# Patient Record
Sex: MALE | Race: WHITE | Employment: OTHER | URBAN - METROPOLITAN AREA
[De-identification: names, ages, dates, MRNs, and addresses within clinical notes are randomized per-mention and may not be internally consistent; named-entity substitution may affect disease eponyms.]

---

## 2020-11-09 ENCOUNTER — OFFICE VISIT (OUTPATIENT)
Dept: INTERNAL MEDICINE CLINIC | Facility: CLINIC | Age: 55
End: 2020-11-09
Payer: COMMERCIAL

## 2020-11-09 VITALS
WEIGHT: 304.13 LBS | SYSTOLIC BLOOD PRESSURE: 126 MMHG | BODY MASS INDEX: 35.91 KG/M2 | HEIGHT: 77 IN | DIASTOLIC BLOOD PRESSURE: 88 MMHG | TEMPERATURE: 98 F | HEART RATE: 86 BPM

## 2020-11-09 DIAGNOSIS — B02.9 HERPES ZOSTER WITHOUT COMPLICATION: Primary | ICD-10-CM

## 2020-11-09 PROCEDURE — 99072 ADDL SUPL MATRL&STAF TM PHE: CPT | Performed by: FAMILY MEDICINE

## 2020-11-09 PROCEDURE — 90686 IIV4 VACC NO PRSV 0.5 ML IM: CPT | Performed by: FAMILY MEDICINE

## 2020-11-09 PROCEDURE — 99203 OFFICE O/P NEW LOW 30 MIN: CPT | Performed by: FAMILY MEDICINE

## 2020-11-09 PROCEDURE — 90471 IMMUNIZATION ADMIN: CPT | Performed by: FAMILY MEDICINE

## 2020-11-09 PROCEDURE — 3079F DIAST BP 80-89 MM HG: CPT | Performed by: FAMILY MEDICINE

## 2020-11-09 PROCEDURE — 3074F SYST BP LT 130 MM HG: CPT | Performed by: FAMILY MEDICINE

## 2020-11-09 PROCEDURE — 3008F BODY MASS INDEX DOCD: CPT | Performed by: FAMILY MEDICINE

## 2020-11-09 RX ORDER — VALACYCLOVIR HYDROCHLORIDE 1 G/1
1 TABLET, FILM COATED ORAL 3 TIMES DAILY
Qty: 21 TABLET | Refills: 0 | Status: SHIPPED | OUTPATIENT
Start: 2020-11-09 | End: 2020-11-16

## 2020-11-09 NOTE — PROGRESS NOTES
HPI:    Patient ID: Trung Whitney is a 48year old male. HPI Started with what he thought was a spider bite on his right cheek near his ear about 5 days ago. Painful, more spots started to develop over the next few days.  Lymph nodes near the area we Schedule physical.     Orders Placed This Encounter      Flulaval 6 months and older 0.5 ml PFS [[id number]]      Meds This Visit:  Requested Prescriptions     Signed Prescriptions Disp Refills   • valACYclovir HCl 1 G Oral Tab 21 tablet 0     Sig: Take 1 table

## 2021-04-09 DIAGNOSIS — Z23 NEED FOR VACCINATION: ICD-10-CM

## 2021-08-06 ENCOUNTER — APPOINTMENT (OUTPATIENT)
Dept: GENERAL RADIOLOGY | Age: 56
End: 2021-08-06
Attending: PHYSICIAN ASSISTANT
Payer: COMMERCIAL

## 2021-08-06 ENCOUNTER — HOSPITAL ENCOUNTER (OUTPATIENT)
Age: 56
Discharge: HOME | End: 2021-08-06
Payer: COMMERCIAL

## 2021-08-06 VITALS
SYSTOLIC BLOOD PRESSURE: 146 MMHG | OXYGEN SATURATION: 98 % | HEART RATE: 65 BPM | RESPIRATION RATE: 16 BRPM | TEMPERATURE: 98 F | DIASTOLIC BLOOD PRESSURE: 90 MMHG

## 2021-08-06 DIAGNOSIS — S90.02XA CONTUSION OF LEFT ANKLE, INITIAL ENCOUNTER: ICD-10-CM

## 2021-08-06 DIAGNOSIS — S93.402A SPRAIN OF LEFT ANKLE, UNSPECIFIED LIGAMENT, INITIAL ENCOUNTER: Primary | ICD-10-CM

## 2021-08-06 PROCEDURE — 73630 X-RAY EXAM OF FOOT: CPT | Performed by: PHYSICIAN ASSISTANT

## 2021-08-06 PROCEDURE — 99203 OFFICE O/P NEW LOW 30 MIN: CPT | Performed by: PHYSICIAN ASSISTANT

## 2021-08-06 PROCEDURE — 73610 X-RAY EXAM OF ANKLE: CPT | Performed by: PHYSICIAN ASSISTANT

## 2021-08-06 NOTE — ED INITIAL ASSESSMENT (HPI)
Pt with c/o left foot pain and swelling. Pt stepped wrong on Wednesday injuring foot.   Pt icing at home

## 2021-08-06 NOTE — ED PROVIDER NOTES
Patient Seen in: [facility] [address] [place] [address]      History   Patient presents with:  Leg or Foot Injury    Stated Complaint: foot swollen/painful    HPI/Subjective:   HPI    52-year-old male presents to the IC for evaluation of left ankle and foot pain since 2 ligament tenderness. Decreased range of motion. Normal pulse. Left foot: Normal range of motion. Bony tenderness (along 5th metatarsal) present. No swelling or deformity. Normal pulse. Skin:     General: Skin is warm and dry.       Capillary Refill: pain and swelling all around left ankle and down left lateral foot. FINDINGS:  BONES:  There are enthesophytes on dorsal and plantar aspect of the calcaneus.   There is mild osteophyte formation dorsal aspect of the talonavicular joint and articulation o

## 2022-04-18 ENCOUNTER — TELEPHONE (OUTPATIENT)
Dept: INTERNAL MEDICINE CLINIC | Facility: CLINIC | Age: 57
End: 2022-04-18

## 2022-04-18 NOTE — TELEPHONE ENCOUNTER
Please call patient to see if he has new PCP. If he doesn't make sure he schedules an annual check-up.  If he no longer sees me as his PCP, send for removal.

## 2023-09-11 ENCOUNTER — LAB ENCOUNTER (OUTPATIENT)
Dept: LAB | Age: 58
End: 2023-09-11
Attending: INTERNAL MEDICINE
Payer: COMMERCIAL

## 2023-09-11 ENCOUNTER — OFFICE VISIT (OUTPATIENT)
Dept: INTERNAL MEDICINE CLINIC | Facility: CLINIC | Age: 58
End: 2023-09-11
Payer: COMMERCIAL

## 2023-09-11 VITALS
HEIGHT: 77 IN | BODY MASS INDEX: 32.82 KG/M2 | SYSTOLIC BLOOD PRESSURE: 128 MMHG | DIASTOLIC BLOOD PRESSURE: 84 MMHG | WEIGHT: 278 LBS | RESPIRATION RATE: 16 BRPM | OXYGEN SATURATION: 98 % | TEMPERATURE: 98 F | HEART RATE: 73 BPM

## 2023-09-11 DIAGNOSIS — Z12.5 SCREENING FOR PROSTATE CANCER: ICD-10-CM

## 2023-09-11 DIAGNOSIS — Z13.29 SCREENING FOR THYROID DISORDER: ICD-10-CM

## 2023-09-11 DIAGNOSIS — Z13.0 SCREENING FOR BLOOD DISEASE: ICD-10-CM

## 2023-09-11 DIAGNOSIS — Z12.11 SCREEN FOR COLON CANCER: ICD-10-CM

## 2023-09-11 DIAGNOSIS — Z83.3 FAMILY HISTORY OF DIABETES MELLITUS: ICD-10-CM

## 2023-09-11 DIAGNOSIS — Z00.00 WELLNESS EXAMINATION: Primary | ICD-10-CM

## 2023-09-11 DIAGNOSIS — Z13.228 SCREENING FOR METABOLIC DISORDER: ICD-10-CM

## 2023-09-11 DIAGNOSIS — Z98.890 HISTORY OF CRANIOTOMY: ICD-10-CM

## 2023-09-11 DIAGNOSIS — Z13.220 SCREENING, LIPID: ICD-10-CM

## 2023-09-11 DIAGNOSIS — E66.09 CLASS 1 OBESITY DUE TO EXCESS CALORIES WITHOUT SERIOUS COMORBIDITY WITH BODY MASS INDEX (BMI) OF 32.0 TO 32.9 IN ADULT: ICD-10-CM

## 2023-09-11 DIAGNOSIS — Z23 NEED FOR VACCINATION FOR ZOSTER: ICD-10-CM

## 2023-09-11 PROBLEM — E66.811 CLASS 1 OBESITY DUE TO EXCESS CALORIES WITHOUT SERIOUS COMORBIDITY WITH BODY MASS INDEX (BMI) OF 32.0 TO 32.9 IN ADULT: Status: ACTIVE | Noted: 2023-09-11

## 2023-09-11 PROBLEM — E78.00 HYPERCHOLESTEROLEMIA: Status: ACTIVE | Noted: 2023-09-11

## 2023-09-11 LAB
ALBUMIN SERPL-MCNC: 3.3 G/DL (ref 3.4–5)
ALBUMIN/GLOB SERPL: 0.8 {RATIO} (ref 1–2)
ALP LIVER SERPL-CCNC: 64 U/L
ALT SERPL-CCNC: 21 U/L
ANION GAP SERPL CALC-SCNC: 7 MMOL/L (ref 0–18)
AST SERPL-CCNC: 19 U/L (ref 15–37)
BASOPHILS # BLD AUTO: 0.12 X10(3) UL (ref 0–0.2)
BASOPHILS NFR BLD AUTO: 1.3 %
BILIRUB SERPL-MCNC: 0.4 MG/DL (ref 0.1–2)
BUN BLD-MCNC: 14 MG/DL (ref 7–18)
CALCIUM BLD-MCNC: 8.5 MG/DL (ref 8.5–10.1)
CHLORIDE SERPL-SCNC: 107 MMOL/L (ref 98–112)
CHOLEST SERPL-MCNC: 175 MG/DL (ref ?–200)
CO2 SERPL-SCNC: 25 MMOL/L (ref 21–32)
COMPLEXED PSA SERPL-MCNC: 1.08 NG/ML (ref ?–4)
CREAT BLD-MCNC: 0.97 MG/DL
EGFRCR SERPLBLD CKD-EPI 2021: 92 ML/MIN/1.73M2 (ref 60–?)
EOSINOPHIL # BLD AUTO: 0.16 X10(3) UL (ref 0–0.7)
EOSINOPHIL NFR BLD AUTO: 1.8 %
ERYTHROCYTE [DISTWIDTH] IN BLOOD BY AUTOMATED COUNT: 13.2 %
EST. AVERAGE GLUCOSE BLD GHB EST-MCNC: 111 MG/DL (ref 68–126)
FASTING PATIENT LIPID ANSWER: YES
FASTING STATUS PATIENT QL REPORTED: YES
GLOBULIN PLAS-MCNC: 4 G/DL (ref 2.8–4.4)
GLUCOSE BLD-MCNC: 82 MG/DL (ref 70–99)
HBA1C MFR BLD: 5.5 % (ref ?–5.7)
HCT VFR BLD AUTO: 45.5 %
HDLC SERPL-MCNC: 53 MG/DL (ref 40–59)
HGB BLD-MCNC: 15.1 G/DL
IMM GRANULOCYTES # BLD AUTO: 0.03 X10(3) UL (ref 0–1)
IMM GRANULOCYTES NFR BLD: 0.3 %
LDLC SERPL CALC-MCNC: 105 MG/DL (ref ?–100)
LYMPHOCYTES # BLD AUTO: 2.96 X10(3) UL (ref 1–4)
LYMPHOCYTES NFR BLD AUTO: 32.6 %
MCH RBC QN AUTO: 29.6 PG (ref 26–34)
MCHC RBC AUTO-ENTMCNC: 33.2 G/DL (ref 31–37)
MCV RBC AUTO: 89.2 FL
MONOCYTES # BLD AUTO: 0.85 X10(3) UL (ref 0.1–1)
MONOCYTES NFR BLD AUTO: 9.4 %
NEUTROPHILS # BLD AUTO: 4.96 X10 (3) UL (ref 1.5–7.7)
NEUTROPHILS # BLD AUTO: 4.96 X10(3) UL (ref 1.5–7.7)
NEUTROPHILS NFR BLD AUTO: 54.6 %
NONHDLC SERPL-MCNC: 122 MG/DL (ref ?–130)
OSMOLALITY SERPL CALC.SUM OF ELEC: 288 MOSM/KG (ref 275–295)
PLATELET # BLD AUTO: 343 10(3)UL (ref 150–450)
POTASSIUM SERPL-SCNC: 4.2 MMOL/L (ref 3.5–5.1)
PROT SERPL-MCNC: 7.3 G/DL (ref 6.4–8.2)
RBC # BLD AUTO: 5.1 X10(6)UL
SODIUM SERPL-SCNC: 139 MMOL/L (ref 136–145)
TRIGL SERPL-MCNC: 95 MG/DL (ref 30–149)
TSI SER-ACNC: 0.87 MIU/ML (ref 0.36–3.74)
VLDLC SERPL CALC-MCNC: 16 MG/DL (ref 0–30)
WBC # BLD AUTO: 9.1 X10(3) UL (ref 4–11)

## 2023-09-11 PROCEDURE — 80061 LIPID PANEL: CPT

## 2023-09-11 PROCEDURE — 90750 HZV VACC RECOMBINANT IM: CPT | Performed by: INTERNAL MEDICINE

## 2023-09-11 PROCEDURE — 90471 IMMUNIZATION ADMIN: CPT | Performed by: INTERNAL MEDICINE

## 2023-09-11 PROCEDURE — 3008F BODY MASS INDEX DOCD: CPT | Performed by: INTERNAL MEDICINE

## 2023-09-11 PROCEDURE — 99386 PREV VISIT NEW AGE 40-64: CPT | Performed by: INTERNAL MEDICINE

## 2023-09-11 PROCEDURE — 84443 ASSAY THYROID STIM HORMONE: CPT

## 2023-09-11 PROCEDURE — 85025 COMPLETE CBC W/AUTO DIFF WBC: CPT

## 2023-09-11 PROCEDURE — 36415 COLL VENOUS BLD VENIPUNCTURE: CPT

## 2023-09-11 PROCEDURE — 80053 COMPREHEN METABOLIC PANEL: CPT

## 2023-09-11 PROCEDURE — 3074F SYST BP LT 130 MM HG: CPT | Performed by: INTERNAL MEDICINE

## 2023-09-11 PROCEDURE — 83036 HEMOGLOBIN GLYCOSYLATED A1C: CPT

## 2023-09-11 PROCEDURE — 3079F DIAST BP 80-89 MM HG: CPT | Performed by: INTERNAL MEDICINE

## 2023-12-08 PROBLEM — D12.0 BENIGN NEOPLASM OF CECUM: Status: ACTIVE | Noted: 2023-12-08

## 2023-12-08 PROBLEM — Z12.11 SPECIAL SCREENING FOR MALIGNANT NEOPLASM OF COLON: Status: ACTIVE | Noted: 2023-12-08

## 2023-12-08 PROBLEM — D12.5 BENIGN NEOPLASM OF SIGMOID COLON: Status: ACTIVE | Noted: 2023-12-08

## 2024-01-17 NOTE — PROGRESS NOTES
Hematology/Oncology Initial Consultation Note    Patient Name: Good Gaytan  Medical Record Number: [id number]    YOB: 1965   Date of Consultation: 1/22/2024   PCP: Pedro Chen DO    Reason for Consultation:  Good Gaytan was seen today for the diagnosis of colon cancer    Oncologic History:  12/8/23: s/p colonoscopy with polypectomy in sigmoid colon; path positive for moderately differentiated adenocarcinoma, invasive into stalk of background pedunculated tubulovillous adenoma with high grade dysplasia. Margins negative    History of Present Illness:      59 y/o M PMH cavernous angioma resected 2007, colon ca s/p polypectomy 12/2023 who presents to [facility] care.    - here with wife  - this was his first screening colonoscopy  - he was not having any rectal bleeding, abdominal pain, weight loss  - he also has a hx of cavernous angioma s/p resection in 2007; did not follow up after but denies any recurrence  - never smoker. Occasional alcohol. Obese.  Has 2 kids. Sibling has gotten a colonoscopy  - no known FH of cancer    Past Medical History:  Past Medical History:   Diagnosis Date    Calculus of kidney     Cavernous angioma     Pain in joints        No past surgical history on file.    Home Medications:  No outpatient medications have been marked as taking for the 1/22/24 encounter (Appointment) with Michael Odom MD.     -------  Current Outpatient Medications on File Prior to Visit   Medication Sig Dispense Refill    PEG 3350-KCl-Na Bicarb-NaCl 420 g Oral Recon Soln Take as directed by physician 4000 mL 0     No current facility-administered medications on file prior to visit.       Allergies:   No Known Allergies    Psychosocial History:  Social History     Social History Narrative    Not on file     Social History     Socioeconomic History    Marital status:    Tobacco Use    Smoking status: Never    Smokeless tobacco: Never   Substance and Sexual Activity    Alcohol  use: Yes    Drug use: Never       Family Medical History:  Family History   Problem Relation Age of Onset    Diabetes Mother        Review of Systems:  A 10-point ROS was done with pertinent positives and negative per the HPI    Vital Signs:  Height: --  Weight: --  BSA (Calculated - sq m): --  Pulse: --  BP: --  Temp: --  Do Not Use - Resp Rate: --  SpO2: --    Wt Readings from Last 6 Encounters:   12/07/23 126.1 kg (278 lb)   09/11/23 126.1 kg (278 lb)   11/09/20 (!) 138 kg (304 lb 2 oz)         Physical Examination:  General: Patient is alert and oriented, not in acute distress  Psych: Mood and affect are appropriate  Eyes: EOMI, PERRL  ENT: Oropharynx is clear, no adenopathy  CV: no LE edema  Respiratory: Non-labored respirations  GI/Abd: Soft, non-tender, no palpable masses  Neurological: Grossly intact   Lymphatics: No palpable cervical, supraclavicular, axillary, or inguinal lymphadenopathy  Skin: no rashes or petechiae    Laboratory:  Lab Results   Component Value Date    WBC 9.1 09/11/2023    HGB 15.1 09/11/2023    HCT 45.5 09/11/2023    MCV 89.2 09/11/2023    MCH 29.6 09/11/2023    MCHC 33.2 09/11/2023    RDW 13.2 09/11/2023    .0 09/11/2023     Lab Results   Component Value Date    GLU 82 09/11/2023    BUN 14 09/11/2023    CREATSERUM 0.97 09/11/2023    ANIONGAP 7 09/11/2023    CA 8.5 09/11/2023    OSMOCALC 288 09/11/2023    ALKPHO 64 09/11/2023    AST 19 09/11/2023    ALT 21 09/11/2023    BILT 0.4 09/11/2023    TP 7.3 09/11/2023    ALB 3.3 (L) 09/11/2023    GLOBULIN 4.0 09/11/2023     09/11/2023    K 4.2 09/11/2023     09/11/2023    CO2 25.0 09/11/2023     No results found for: \"PTT\", \"PT\", \"INR\"    Impression & Plan:     Colon cancer  - small focus in pedunculated sigmoid colon polyp s/p polypectomy  - per NCCN guidelines: no imaging needed and only colonoscopic surveillance needed. I recommended obtaining a repeat colonoscopy in 3-6 months which he discussed he was already arranging  with Dr Monterroso. Surveillance schedule thereafter to be decided by GI, but no later than every 5 years  - discussed colonoscopic surveillance of his children to start at age 40  - we discussed diet and exercise, especially weight loss, to reduce recurrence risk of colon cancer    Health maintenance  - we discussed importance of regular medical care with PCP  - has obtained PSA; PSA normal    Michael Odom MD  Hematology/Medical Oncology  [facility]

## 2024-01-22 ENCOUNTER — OFFICE VISIT (OUTPATIENT)
Dept: HEMATOLOGY/ONCOLOGY | Facility: HOSPITAL | Age: 59
End: 2024-01-22
Attending: INTERNAL MEDICINE
Payer: COMMERCIAL

## 2024-01-22 VITALS
OXYGEN SATURATION: 97 % | HEART RATE: 69 BPM | DIASTOLIC BLOOD PRESSURE: 92 MMHG | WEIGHT: 281.19 LBS | TEMPERATURE: 98 F | SYSTOLIC BLOOD PRESSURE: 152 MMHG | BODY MASS INDEX: 33 KG/M2 | RESPIRATION RATE: 18 BRPM

## 2024-01-22 DIAGNOSIS — C18.7 MALIGNANT NEOPLASM OF SIGMOID COLON (HCC): Primary | ICD-10-CM

## 2024-01-22 DIAGNOSIS — E66.09 CLASS 1 OBESITY DUE TO EXCESS CALORIES WITHOUT SERIOUS COMORBIDITY WITH BODY MASS INDEX (BMI) OF 32.0 TO 32.9 IN ADULT: ICD-10-CM

## 2024-01-22 PROCEDURE — 99211 OFF/OP EST MAY X REQ PHY/QHP: CPT

## 2024-01-22 NOTE — PROGRESS NOTES
Education Record    Learner:  Patient and Spouse    Disease / Diagnosis: +colon polyp    Barriers / Limitations:  None   Comments:    Method:  Discussion   Comments:    General Topics:  Medication   Comments:    Outcome:  Shows understanding   Comments:    Here for new consult to establish care with Dr. Odom after a positive polyp in his colonoscopy. It was a routine screening colonoscopy. There were no preexisting issues or complaints beforehand. There are no complaints today.

## 2024-04-12 PROBLEM — Z85.038 PERSONAL HISTORY OF COLON CANCER: Status: ACTIVE | Noted: 2024-04-12

## 2024-11-18 ENCOUNTER — NURSE TRIAGE (OUTPATIENT)
Dept: FAMILY MEDICINE CLINIC | Facility: CLINIC | Age: 59
End: 2024-11-18

## 2024-11-18 NOTE — TELEPHONE ENCOUNTER
Action Requested: Summary for Provider     []  Critical Lab, Recommendations Needed  [] Need Additional Advice  [x]   FYI    []   Need Orders  [] Need Medications Sent to Pharmacy  []  Other     SUMMARY: Pt calling to check if he is OK to wait to be seen until 11/19 for reported symptoms.  After triage of symptoms, appt remains as scheduled.  Home Care interventions discussed.    Reason for call: Cough/URI  Onset: Saturday    Notes:  - Symptoms started Saturday. + for wet n-p cough, nasal congestion, sore throat, body aches.  - Negative for: fever, hemoptysis, difficulty breathing, SOB, wheezing, recent travel/sick contacts.  - Already doing Mucinex DM, tea with honey, Vicks + steam showers.  - Pt had already been scheduled for OV by PSR, advised to keep appt and continue home care interventions discussed.    Future Appointments   Date Time Provider Department Center   11/19/2024  9:00 AM Fouzia Zapien APRN [facility]       Reason for Disposition   Patient wants to be seen    Protocols used: Cough-A-OH

## 2024-11-19 ENCOUNTER — OFFICE VISIT (OUTPATIENT)
Dept: INTERNAL MEDICINE CLINIC | Facility: CLINIC | Age: 59
End: 2024-11-19
Payer: COMMERCIAL

## 2024-11-19 VITALS
HEIGHT: 77 IN | HEART RATE: 77 BPM | BODY MASS INDEX: 33.3 KG/M2 | SYSTOLIC BLOOD PRESSURE: 130 MMHG | OXYGEN SATURATION: 97 % | DIASTOLIC BLOOD PRESSURE: 70 MMHG | RESPIRATION RATE: 20 BRPM | WEIGHT: 282 LBS | TEMPERATURE: 98 F

## 2024-11-19 DIAGNOSIS — R06.2 WHEEZING: Primary | ICD-10-CM

## 2024-11-19 DIAGNOSIS — R05.1 ACUTE COUGH: ICD-10-CM

## 2024-11-19 PROCEDURE — 99214 OFFICE O/P EST MOD 30 MIN: CPT

## 2024-11-19 RX ORDER — BENZONATATE 100 MG/1
100 CAPSULE ORAL 3 TIMES DAILY PRN
Qty: 21 CAPSULE | Refills: 0 | Status: SHIPPED | OUTPATIENT
Start: 2024-11-19 | End: 2024-11-26

## 2024-11-19 RX ORDER — ALBUTEROL SULFATE 90 UG/1
2 INHALANT RESPIRATORY (INHALATION) EVERY 6 HOURS PRN
Qty: 1 EACH | Refills: 0 | Status: SHIPPED | OUTPATIENT
Start: 2024-11-19

## 2024-11-19 NOTE — PROGRESS NOTES
Good Gaytan is a 59 year old male.   Chief Complaint   Patient presents with    Cough     Yb rm 16b   Cold since Saturday  mucous in chest  and head      HPI:    Patient here today for headache, congestion, productive cough, body aches. Unknown ill contact. No fever or chills. Otherwise doing well. He reports 35% seen in the last 24 hours. No shortness of breath, weakness or chest pain. Using mucinex d over the counter which helps. Sleeping ok. Appetite ok. No GI symptoms. Denies covid testing at home.     Allergies:  Allergies[1]   Current Meds:  Current Outpatient Medications   Medication Sig Dispense Refill    PEG 3350-KCl-Na Bicarb-NaCl 420 g Oral Recon Soln Take as directed by physician. (Patient not taking: Reported on 11/19/2024) 4000 mL 0    PEG 3350-KCl-Na Bicarb-NaCl 420 g Oral Recon Soln Take as directed by physician (Patient not taking: Reported on 11/19/2024) 4000 mL 0        PMH:     Past Medical History:    Calculus of kidney    Cavernous angioma    Pain in joints       ROS:   Review of Systems   Constitutional:  Positive for fatigue. Negative for activity change, appetite change, chills and fever.   HENT:  Positive for congestion, postnasal drip and sore throat.    Respiratory:  Positive for cough. Negative for chest tightness and shortness of breath.    Cardiovascular: Negative.    Neurological: Negative.             PHYSICAL EXAM:    /70   Pulse 77   Temp 98 °F (36.7 °C) (Temporal)   Resp 20   Ht 6' 5\" (1.956 m)   Wt 282 lb (127.9 kg)   SpO2 97%   BMI 33.44 kg/m²   Physical Exam  Constitutional:       Appearance: Normal appearance.   HENT:      Nose: Congestion present.      Mouth/Throat:      Mouth: Mucous membranes are moist.      Pharynx: Posterior oropharyngeal erythema present. No oropharyngeal exudate.   Cardiovascular:      Rate and Rhythm: Normal rate.   Pulmonary:      Effort: Pulmonary effort is normal. No respiratory distress.      Breath sounds: No stridor. Wheezing  present. No rhonchi or rales.   Skin:     General: Skin is warm and dry.   Neurological:      Mental Status: He is alert.        ASSESSMENT/ PLAN:   1. Wheezing  - albuterol 108 (90 Base) MCG/ACT Inhalation Aero Soln; Inhale 2 puffs into the lungs every 6 (six) hours as needed.  Dispense: 1 each; Refill: 0  - benzonatate 100 MG Oral Cap; Take 1 capsule (100 mg total) by mouth 3 (three) times daily as needed for cough.  Dispense: 21 capsule; Refill: 0    2. Acute cough  Mucinex-d as needed, tylenol as needed, hydrate, vicks vapo rub, humidifier use discussed.  - albuterol 108 (90 Base) MCG/ACT Inhalation Aero Soln; Inhale 2 puffs into the lungs every 6 (six) hours as needed.  Dispense: 1 each; Refill: 0  - benzonatate 100 MG Oral Cap; Take 1 capsule (100 mg total) by mouth 3 (three) times daily as needed for cough.  Dispense: 21 capsule; Refill: 0      Health Maintenance Due   Topic Date Due    Pneumococcal Vaccine: Birth to 64yrs (1 of 2 - PCV) Never done    DTaP,Tdap,and Td Vaccines (1 - Tdap) Never done    Zoster Vaccines (2 of 2) 11/06/2023    Annual Depression Screening  01/01/2024    COVID-19 Vaccine (3 - 2024-25 season) 09/01/2024    Annual Physical  09/11/2024    Influenza Vaccine (1) 10/01/2024    Colorectal Cancer Screening  04/12/2025     Pt indicates understanding and agrees to the plan.     Follow up if symptoms fail to improve    ANUM Paige          [1] No Known Allergies

## 2024-12-12 DIAGNOSIS — R05.1 ACUTE COUGH: ICD-10-CM

## 2024-12-12 DIAGNOSIS — R06.2 WHEEZING: ICD-10-CM

## 2024-12-17 RX ORDER — ALBUTEROL SULFATE 90 UG/1
2 INHALANT RESPIRATORY (INHALATION) EVERY 6 HOURS PRN
Qty: 6.7 EACH | Refills: 0 | Status: SHIPPED | OUTPATIENT
Start: 2024-12-17

## 2024-12-17 NOTE — TELEPHONE ENCOUNTER
Please review; protocol failed/ has no protocol      Please review last office visit 11/19/2024      last refill 11/19/2024    Is refill appropriate?           Requested Prescriptions   Pending Prescriptions Disp Refills    ALBUTEROL 108 (90 Base) MCG/ACT Inhalation Aero Soln [Pharmacy Med Name: ALBUTEROL HFA (PROVENTIL) INH] 6.7 each 0     Sig: TAKE 2 PUFFS BY MOUTH EVERY 6 HOURS AS NEEDED       Asthma & COPD Medication Protocol Failed - 12/17/2024  7:55 AM        Failed - ACT Score greater than or equal to 20                Failed - ACT recorded in the last 12 months                Passed - Appointment in past 6 or next 3 months      Recent Outpatient Visits              4 weeks ago Wheezing    [facility], [address] [place], [place] [address]Fouzia Yang APRN    Office Visit    11 months ago Malignant neoplasm of sigmoid colon ([facility]    [facility]    1 year ago Wellness examination    [facility], [address] [place] [address] Pedro Chen DO    Office Visit    4 years ago Herpes zoster without complication    [address]    Office Visit                         Recent Outpatient Visits              4 weeks ago Wheezing    [facility], [address] [place], [place] [address]Fouzia Yang APRN    Office Visit    11 months ago Malignant neoplasm of sigmoid colon ([facility]    [facility]    1 year ago Wellness examination    [address] Pedro Chen DO    Office Visit    4 years ago Herpes zoster without complication    [address]    Office Visit